# Patient Record
Sex: FEMALE | Race: WHITE | URBAN - METROPOLITAN AREA
[De-identification: names, ages, dates, MRNs, and addresses within clinical notes are randomized per-mention and may not be internally consistent; named-entity substitution may affect disease eponyms.]

---

## 2017-05-05 ENCOUNTER — OFFICE VISIT (OUTPATIENT)
Dept: URGENT CARE | Facility: RETAIL CLINIC | Age: 27
End: 2017-05-05
Payer: COMMERCIAL

## 2017-05-05 VITALS — SYSTOLIC BLOOD PRESSURE: 134 MMHG | HEART RATE: 101 BPM | TEMPERATURE: 99.5 F | DIASTOLIC BLOOD PRESSURE: 86 MMHG

## 2017-05-05 DIAGNOSIS — J02.0 ACUTE STREPTOCOCCAL PHARYNGITIS: Primary | ICD-10-CM

## 2017-05-05 DIAGNOSIS — J02.9 ACUTE PHARYNGITIS, UNSPECIFIED ETIOLOGY: ICD-10-CM

## 2017-05-05 LAB — S PYO AG THROAT QL IA.RAPID: ABNORMAL

## 2017-05-05 PROCEDURE — 99213 OFFICE O/P EST LOW 20 MIN: CPT | Performed by: PHYSICIAN ASSISTANT

## 2017-05-05 PROCEDURE — 87880 STREP A ASSAY W/OPTIC: CPT | Mod: QW | Performed by: PHYSICIAN ASSISTANT

## 2017-05-05 RX ORDER — PENICILLIN V POTASSIUM 500 MG/1
500 TABLET, FILM COATED ORAL 2 TIMES DAILY
Qty: 20 TABLET | Refills: 0 | Status: SHIPPED | OUTPATIENT
Start: 2017-05-05 | End: 2017-05-15

## 2017-05-05 NOTE — MR AVS SNAPSHOT
"              After Visit Summary   5/5/2017    Viola Breen    MRN: [id number]           Patient Information     Date Of Birth          1990        Visit Information        Provider Department      5/5/2017 2:40 PM Tanja Weinstein PA-C [facility]        Today's Diagnoses     Acute streptococcal pharyngitis    -  1    Acute pharyngitis, unspecified etiology          Care Instructions    Antibiotics as directed- penicillin twice daily for 10 days.  Drink plenty of fluids and rest.  May use salt water gargles- about 8 oz warm water with about 1 teaspoon salt  Over the counter pain relievers such as Tylenol may be used as needed.   Honey lemon tea helps to soothe the throat. \"Throat Coat\" tea is soothing as well.  Change toothbrush after 24 hours of antibiotics (may soak in 3-6% hydrogen peroxide)  Will be contagious for 24 hours after starting antibiotic  May return to school//work/activities 24 hours after antibiotics are started.  Wash hands frequently and do not share beverages.  Please follow up with primary care provider if symptoms are not improving, worsening or new symptoms or for any adverse reactions to medications.           Follow-ups after your visit        Who to contact     You can reach your care team any time of the day by calling [phone].  Notification of test results:  If you have an abnormal lab result, we will notify you by phone as soon as possible.         Additional Information About Your Visit        MyChart Information     [facility] gives you secure access to your electronic health record. If you see a primary care provider, you can also send messages to your care team and make appointments. If you have questions, please call your primary care clinic.  If you do not have a primary care provider, please call [phone] and they will assist you.        Care EveryWhere ID     This is your Care EveryWhere ID. This could be used by other organizations to " access your [place] medical records  [id number]        Your Vitals Were     Pulse Temperature                101 99.5  F (37.5  C) (Oral)           Blood Pressure from Last 3 Encounters:   05/05/17 134/86   01/29/15 (!) 150/99   12/28/14 142/90    Weight from Last 3 Encounters:   01/29/15 219 lb 9.6 oz (99.6 kg)   12/28/14 219 lb (99.3 kg)   12/08/14 214 lb 9.6 oz (97.3 kg)              We Performed the Following     BETA STREP GROUP A R/O CULTURE     RAPID STREP SCREEN          Today's Medication Changes          These changes are accurate as of: 5/5/17  3:14 PM.  If you have any questions, ask your nurse or doctor.               Start taking these medicines.        Dose/Directions    penicillin V potassium 500 MG tablet   Commonly known as:  VEETID   Used for:  Acute streptococcal pharyngitis        Dose:  500 mg   Take 1 tablet (500 mg) by mouth 2 times daily for 10 days   Quantity:  20 tablet   Refills:  0            Where to get your medicines      These medications were sent to [facility] [place], [place] - [address] [facility]  [address] [facility] [address]     Phone:  [phone]     penicillin V potassium 500 MG tablet                Primary Care Provider    None Specified       No primary provider on file.        Thank you!     Thank you for choosing [facility]  for your care. Our goal is always to provide you with excellent care. Hearing back from our patients is one way we can continue to improve our services. Please take a few minutes to complete the written survey that you may receive in the mail after your visit with us. Thank you!             Your Updated Medication List - Protect others around you: Learn how to safely use, store and throw away your medicines at www.disposemymeds.org.          This list is accurate as of: 5/5/17  3:14 PM.  Always use your most recent med list.                   Brand Name Dispense Instructions for use    penicillin V potassium  500 MG tablet    VEETID    20 tablet    Take 1 tablet (500 mg) by mouth 2 times daily for 10 days       TYLENOL PO

## 2017-05-05 NOTE — PATIENT INSTRUCTIONS
"Antibiotics as directed- penicillin twice daily for 10 days.  Drink plenty of fluids and rest.  May use salt water gargles- about 8 oz warm water with about 1 teaspoon salt  Over the counter pain relievers such as Tylenol may be used as needed.   Honey lemon tea helps to soothe the throat. \"Throat Coat\" tea is soothing as well.  Change toothbrush after 24 hours of antibiotics (may soak in 3-6% hydrogen peroxide)  Will be contagious for 24 hours after starting antibiotic  May return to school//work/activities 24 hours after antibiotics are started.  Wash hands frequently and do not share beverages.  Please follow up with primary care provider if symptoms are not improving, worsening or new symptoms or for any adverse reactions to medications.     "

## 2017-05-05 NOTE — PROGRESS NOTES
Chief Complaint   Patient presents with     Otalgia     Left; 2 days; pain from ear down neck; recent cold 1 week ago including chills, sweats     Throat Pain     all week; left side the past 2 days; tylenol this morning     Nasal Congestion     stuffy, from drainage     SUBJECTIVE:  Viola Breen is a 26 year old female presenting with a chief complaint of a sore throat.  Onset of symptoms was 5 days ago.  Course of illness: gradual onset and worsening in the last 2 days.  Severity: moderate  Current and Associated symptoms: nasal congestion, ear pain, chills  Treatment measures tried include: OTC meds.  Predisposing factors include: None, though of note, she is 28 weeks pregnant.    Past Medical History:   Diagnosis Date     History of bicornuate uterus      Current Outpatient Prescriptions   Medication Sig Dispense Refill     Acetaminophen (TYLENOL PO)        penicillin V potassium (VEETID) 500 MG tablet Take 1 tablet (500 mg) by mouth 2 times daily for 10 days 20 tablet 0     Social History   Substance Use Topics     Smoking status: Never Smoker     Smokeless tobacco: Never Used     Alcohol use Yes      Comment: occasional     No Known Allergies  ROS:  Review of systems negative except as stated above.    OBJECTIVE:   /86 (BP Location: Left arm)  Pulse 101  Temp 99.5  F (37.5  C) (Oral)  GENERAL APPEARANCE: healthy, alert and in no distress  HEENT: Eyes PEERL, conjunctiva clear. Bilateral ear canals and TMs normal. Nose normal. Pharynx erythematous without tonsillar hypertrophy or exudate noted.  NECK: supple, non-tender to palpation, no adenopathy noted  RESP: lungs clear to auscultation - no rales, rhonchi or wheezes  CV: regular rates and rhythm, normal S1 S2, no murmur noted    Rapid Strep test is positive    ASSESSMENT:    ICD-10-CM    1. Acute streptococcal pharyngitis J02.0 penicillin V potassium (VEETID) 500 MG tablet   2. Acute pharyngitis, unspecified etiology J02.9 RAPID STREP SCREEN      "CANCELED: BETA STREP GROUP A R/O CULTURE     PLAN:   Patient Instructions   Antibiotics as directed- penicillin twice daily for 10 days.  Drink plenty of fluids and rest.  May use salt water gargles- about 8 oz warm water with about 1 teaspoon salt  Over the counter pain relievers such as Tylenol may be used as needed.   Honey lemon tea helps to soothe the throat. \"Throat Coat\" tea is soothing as well.  Change toothbrush after 24 hours of antibiotics (may soak in 3-6% hydrogen peroxide)  Will be contagious for 24 hours after starting antibiotic  May return to school//work/activities 24 hours after antibiotics are started.  Wash hands frequently and do not share beverages.  Please follow up with primary care provider if symptoms are not improving, worsening or new symptoms or for any adverse reactions to medications.       Follow up with primary care provider with any problems, questions or concerns or if symptoms worsen or fail to improve. Patient agreed to plan and verbalized understanding.    Caro Weinstein PA-C  [facility]  "

## 2017-05-05 NOTE — NURSING NOTE
"Chief Complaint   Patient presents with     Otalgia     Left; 2 days; pain from ear down neck; recent cold 1 week ago including chills, sweats     Throat Pain     all week; left side the past 2 days; tylenol this morning     Nasal Congestion     stuffy, from drainage       Initial /86 (BP Location: Left arm)  Pulse 101  Temp 99.5  F (37.5  C) (Oral) Estimated body mass index is 37.69 kg/(m^2) as calculated from the following:    Height as of 12/28/14: 5' 4\" (1.626 m).    Weight as of 1/29/15: 219 lb 9.6 oz (99.6 kg).  Medication Reconciliation: complete  "

## 2017-12-24 ENCOUNTER — HEALTH MAINTENANCE LETTER (OUTPATIENT)
Age: 27
End: 2017-12-24

## 2020-03-10 ENCOUNTER — HEALTH MAINTENANCE LETTER (OUTPATIENT)
Age: 30
End: 2020-03-10

## 2020-12-20 ENCOUNTER — HEALTH MAINTENANCE LETTER (OUTPATIENT)
Age: 30
End: 2020-12-20

## 2021-04-24 ENCOUNTER — HEALTH MAINTENANCE LETTER (OUTPATIENT)
Age: 31
End: 2021-04-24

## 2021-10-03 ENCOUNTER — HEALTH MAINTENANCE LETTER (OUTPATIENT)
Age: 31
End: 2021-10-03

## 2022-05-15 ENCOUNTER — HEALTH MAINTENANCE LETTER (OUTPATIENT)
Age: 32
End: 2022-05-15

## 2022-09-11 ENCOUNTER — HEALTH MAINTENANCE LETTER (OUTPATIENT)
Age: 32
End: 2022-09-11

## 2023-06-03 ENCOUNTER — HEALTH MAINTENANCE LETTER (OUTPATIENT)
Age: 33
End: 2023-06-03